# Patient Record
Sex: MALE | Race: WHITE | NOT HISPANIC OR LATINO | Employment: FULL TIME | URBAN - METROPOLITAN AREA
[De-identification: names, ages, dates, MRNs, and addresses within clinical notes are randomized per-mention and may not be internally consistent; named-entity substitution may affect disease eponyms.]

---

## 2019-10-15 ENCOUNTER — HOSPITAL ENCOUNTER (EMERGENCY)
Facility: CLINIC | Age: 29
Discharge: HOME | End: 2019-10-15
Attending: PHYSICIAN ASSISTANT | Admitting: PHYSICIAN ASSISTANT
Payer: COMMERCIAL

## 2019-10-15 VITALS
SYSTOLIC BLOOD PRESSURE: 124 MMHG | RESPIRATION RATE: 18 BRPM | TEMPERATURE: 98.8 F | OXYGEN SATURATION: 100 % | HEIGHT: 75 IN | WEIGHT: 245 LBS | HEART RATE: 72 BPM | DIASTOLIC BLOOD PRESSURE: 82 MMHG | BODY MASS INDEX: 30.46 KG/M2

## 2019-10-15 DIAGNOSIS — J20.9 ACUTE BRONCHITIS WITH SYMPTOMS > 10 DAYS: ICD-10-CM

## 2019-10-15 PROCEDURE — G0463 HOSPITAL OUTPT CLINIC VISIT: HCPCS | Performed by: PHYSICIAN ASSISTANT

## 2019-10-15 PROCEDURE — 99214 OFFICE O/P EST MOD 30 MIN: CPT | Mod: Z6 | Performed by: PHYSICIAN ASSISTANT

## 2019-10-15 RX ORDER — ALBUTEROL SULFATE 90 UG/1
2 AEROSOL, METERED RESPIRATORY (INHALATION) EVERY 6 HOURS PRN
Qty: 1 INHALER | Refills: 0 | Status: SHIPPED | OUTPATIENT
Start: 2019-10-15 | End: 2023-03-02

## 2019-10-15 RX ORDER — BENZONATATE 100 MG/1
100-200 CAPSULE ORAL 3 TIMES DAILY PRN
Qty: 42 CAPSULE | Refills: 0 | Status: SHIPPED | OUTPATIENT
Start: 2019-10-15 | End: 2019-11-14

## 2019-10-15 RX ORDER — PREDNISONE 20 MG/1
40 TABLET ORAL DAILY
Qty: 10 TABLET | Refills: 0 | Status: SHIPPED | OUTPATIENT
Start: 2019-10-15 | End: 2019-10-20

## 2019-10-15 ASSESSMENT — MIFFLIN-ST. JEOR: SCORE: 2161.94

## 2019-10-15 NOTE — ED AVS SNAPSHOT
[facility] Emergency Department  [address] [address]  Phone:  [phone]  Fax:  [phone]                                    Isaac Martinez   MRN: [id number]    Department:  [facility] Emergency Department   Date of Visit:  10/15/2019           After Visit Summary Signature Page    I have received my discharge instructions, and my questions have been answered. I have discussed any challenges I see with this plan with the nurse or doctor.    ..........................................................................................................................................  Patient/Patient Representative Signature      ..........................................................................................................................................  Patient Representative Print Name and Relationship to Patient    ..................................................               ................................................  Date                                   Time    ..........................................................................................................................................  Reviewed by Signature/Title    ...................................................              ..............................................  Date                                               Time          22EPIC Rev 08/18

## 2019-10-15 NOTE — ED PROVIDER NOTES
History     Chief Complaint   Patient presents with     Cough     cough  x 2 weeks  unable to sleep  nasal congestion       HPI  Isaac Martinez is a 29 year old male who presents to the urgent care with concern over 2-week history of cough accompanied by nasal congestion, shortness of breath and wheezing.  He states that he may or may not have had an intermittent fever.  He denies any objective fever measured on thermometer, vomiting, diarrhea or abdominal pain.  He denies any history of asthma, COPD or other breathing related disorders.  He is a non-smoker.  He reports that he has been using his wife's inhaler and a left over prescription for robitussin with codeine for the last several days without relief.      Allergies:  No Known Allergies    Problem List:    Patient Active Problem List    Diagnosis Date Noted     CARDIOVASCULAR SCREENING; LDL GOAL LESS THAN 160 10/31/2010     Priority: Medium     Attention deficit hyperactivity disorder (ADHD) 09/01/2005     Priority: Medium     Problem list name updated by automated process. Provider to review          Past Medical History:    Past Medical History:   Diagnosis Date     Attention deficit disorder with hyperactivity(314.01)        Past Surgical History:    Past Surgical History:   Procedure Laterality Date     SURGICAL HISTORY OF -       PE tubes as infant       Family History:    Family History   Problem Relation Age of Onset     Respiratory Mother         ASTHMA       Social History:  Marital Status:  Single [1]  Social History     Tobacco Use     Smoking status: Never Smoker   Substance Use Topics     Alcohol use: No     Drug use: No        Medications:    albuterol (PROAIR HFA/PROVENTIL HFA/VENTOLIN HFA) 108 (90 Base) MCG/ACT inhaler  benzonatate (TESSALON) 100 MG capsule  predniSONE (DELTASONE) 20 MG tablet      Review of Systems  CONSTITUTIONAL:POSITIVE  for questionable intermittent subjective fever, chills, myalgias  INTEGUMENTARY/SKIN: NEGATIVE for  "worrisome rashes, moles or lesions  EYES: NEGATIVE for vision changes or irritation  ENT/MOUTH: POSITIVE for nasal congestion, sore throat NEGATIVE for ear pain  RESP:POSITIVE for cough, shortness of breath, wheezing  GI: NEGATIVE for nausea, vomiting, diarrhea, abdominal pain   Physical Exam   BP: 124/82  Pulse: 72  Temp: 98.8  F (37.1  C)  Resp: 18  Height: 190.5 cm (6' 3\")  Weight: 111.1 kg (245 lb)  SpO2: 100 %  Physical Exam  GENERAL APPEARANCE: healthy, alert and no distress  EYES: EOMI,  PERRL, conjunctiva clear  HENT: ear canals and TM's normal.  Nose and mouth without ulcers, erythema or lesions  NECK: supple, nontender, no lymphadenopathy  RESP: iminished breath sounds initially however with improved respiratory effort he had expiratory wheezing bilaterally  CV: regular rates and rhythm, normal S1 S2, no murmur noted  SKIN: no suspicious lesions or rashes  ED Course        Procedures          Critical Care time:  none            No results found for this or any previous visit (from the past 24 hour(s)).    Medications - No data to display    Assessments & Plan (with Medical Decision Making)     I have reviewed the nursing notes.    I have reviewed the findings, diagnosis, plan and need for follow up with the patient.       New Prescriptions    ALBUTEROL (PROAIR HFA/PROVENTIL HFA/VENTOLIN HFA) 108 (90 BASE) MCG/ACT INHALER    Inhale 2 puffs into the lungs every 6 hours as needed for shortness of breath / dyspnea or wheezing    BENZONATATE (TESSALON) 100 MG CAPSULE    Take 1-2 capsules (100-200 mg) by mouth 3 times daily as needed for cough    PREDNISONE (DELTASONE) 20 MG TABLET    Take 2 tablets (40 mg) by mouth daily for 5 days       Final diagnoses:   Acute bronchitis with symptoms > 10 days     29-year-old male presents to the urgent care with concern over 2-week history of cough, URI nasal congestion, shortness of breath, wheezing and questionable intermittent fever, chills, myalgias.  He had stable " vital signs upon arrival.  Physical exam findings as described above were initially seen decreased breath sounds however patient appeared to have poor respiratory effort, upon greater effort he was noted to have bilateral expiratory wheezing.  Symptoms are consistent with viral bronchitis.  Patient was discharged home stable with prescription for albuterol inhaler, Tessalon, prednisone.  I do not suspect pneumonia, pertussis, PE, bacterial sinusitis at this time will defer further evaluation.  He was instructed to follow-up if no improvement within the next 48 to 72 hours.  Worrisome reasons to return to the ER/UC sooner discussed.    Disclaimer: This note consists of symbols derived from keyboarding, dictation, and/or voice recognition software. As a result, there may be errors in the script that have gone undetected.  Please consider this when interpreting information found in the chart.      10/15/2019   [facility] EMERGENCY DEPARTMENT     Christiana Smith PA-C  10/15/19 2023

## 2023-03-02 ENCOUNTER — OFFICE VISIT (OUTPATIENT)
Dept: INTERNAL MEDICINE | Facility: CLINIC | Age: 33
End: 2023-03-02
Payer: COMMERCIAL

## 2023-03-02 ENCOUNTER — TELEPHONE (OUTPATIENT)
Dept: INTERNAL MEDICINE | Facility: CLINIC | Age: 33
End: 2023-03-02

## 2023-03-02 VITALS
HEART RATE: 95 BPM | BODY MASS INDEX: 34.36 KG/M2 | DIASTOLIC BLOOD PRESSURE: 86 MMHG | WEIGHT: 274.9 LBS | OXYGEN SATURATION: 97 % | SYSTOLIC BLOOD PRESSURE: 122 MMHG

## 2023-03-02 DIAGNOSIS — R06.83 SNORING: ICD-10-CM

## 2023-03-02 DIAGNOSIS — G25.81 RESTLESS LEGS SYNDROME: ICD-10-CM

## 2023-03-02 DIAGNOSIS — J45.901 MILD ASTHMA WITH ACUTE EXACERBATION, UNSPECIFIED WHETHER PERSISTENT: Primary | ICD-10-CM

## 2023-03-02 PROCEDURE — 99203 OFFICE O/P NEW LOW 30 MIN: CPT | Performed by: NURSE PRACTITIONER

## 2023-03-02 RX ORDER — PREDNISONE 20 MG/1
40 TABLET ORAL DAILY
Qty: 5 TABLET | Refills: 0 | Status: SHIPPED | OUTPATIENT
Start: 2023-03-02 | End: 2023-03-03

## 2023-03-02 RX ORDER — ALBUTEROL SULFATE 90 UG/1
2 AEROSOL, METERED RESPIRATORY (INHALATION) EVERY 6 HOURS PRN
Qty: 18 G | Refills: 3 | Status: SHIPPED | OUTPATIENT
Start: 2023-03-02 | End: 2023-04-17

## 2023-03-02 ASSESSMENT — PAIN SCALES - GENERAL: PAINLEVEL: NO PAIN (0)

## 2023-03-02 NOTE — PROGRESS NOTES
Assessment & Plan   Problem List Items Addressed This Visit    None  Visit Diagnoses     Mild asthma with acute exacerbation, unspecified whether persistent    -  Primary    Relevant Medications    albuterol (PROAIR HFA/PROVENTIL HFA/VENTOLIN HFA) 108 (90 Base) MCG/ACT inhaler    predniSONE (DELTASONE) 20 MG tablet    Snoring        Relevant Orders    Adult Sleep Eval & Management  Referral    Restless legs syndrome        Relevant Medications    predniSONE (DELTASONE) 20 MG tablet    Other Relevant Orders    Adult Sleep Eval & Management  Referral           No follow-ups on file.    Anny Perales NP  [facility] ROME Yap is a 32 year old, presenting for the following health issues:  Asthma, restless leg, and Refill Request      HPI           Review of Systems   Unremarkable other than listed above and below         Objective    /86 (BP Location: Right arm, Patient Position: Sitting, Cuff Size: Adult Regular)   Pulse 95   Wt 124.7 kg (274 lb 14.4 oz)   SpO2 97%   BMI 34.36 kg/m    Body mass index is 34.36 kg/m .  Physical Exam   GENERAL: healthy, alert and no distress  EYES: Eyes grossly normal to inspection, PERRL and conjunctivae and sclerae normal  NECK: no adenopathy  RESP: Diminished at the bases grossly clear throughout wheeze noted no clear cough  CV: regular rate and rhythm, normal S1 S2,  PSYCH: mentation appears normal, affect normal/bright

## 2023-03-02 NOTE — TELEPHONE ENCOUNTER
"[facility] pharmacy calling wanting to verify dosage and instructions for use for the Prednisone Rx they received earlier today.  Directions state \"take 2 tablets by mouth daily\" but only a quantity of 5 tablets is to be dispensed.  Is this correct?  Please call pharmacy to discuss at [phone].   "

## 2023-03-03 RX ORDER — PREDNISONE 20 MG/1
40 TABLET ORAL DAILY
Qty: 10 TABLET | Refills: 0 | Status: SHIPPED | OUTPATIENT
Start: 2023-03-03 | End: 2023-04-17

## 2023-04-17 ENCOUNTER — OFFICE VISIT (OUTPATIENT)
Dept: FAMILY MEDICINE | Facility: CLINIC | Age: 33
End: 2023-04-17
Payer: COMMERCIAL

## 2023-04-17 VITALS
DIASTOLIC BLOOD PRESSURE: 80 MMHG | OXYGEN SATURATION: 96 % | TEMPERATURE: 98.3 F | WEIGHT: 278 LBS | HEART RATE: 100 BPM | SYSTOLIC BLOOD PRESSURE: 122 MMHG | BODY MASS INDEX: 34.75 KG/M2

## 2023-04-17 DIAGNOSIS — R06.83 SNORING: ICD-10-CM

## 2023-04-17 DIAGNOSIS — J45.901 MILD ASTHMA WITH ACUTE EXACERBATION, UNSPECIFIED WHETHER PERSISTENT: ICD-10-CM

## 2023-04-17 DIAGNOSIS — F90.9 ATTENTION DEFICIT HYPERACTIVITY DISORDER (ADHD), UNSPECIFIED ADHD TYPE: ICD-10-CM

## 2023-04-17 DIAGNOSIS — K21.00 GASTROESOPHAGEAL REFLUX DISEASE WITH ESOPHAGITIS WITHOUT HEMORRHAGE: Primary | ICD-10-CM

## 2023-04-17 PROCEDURE — 99214 OFFICE O/P EST MOD 30 MIN: CPT | Performed by: FAMILY MEDICINE

## 2023-04-17 RX ORDER — ALBUTEROL SULFATE 90 UG/1
2 AEROSOL, METERED RESPIRATORY (INHALATION) EVERY 6 HOURS PRN
Qty: 18 G | Refills: 11 | Status: SHIPPED | OUTPATIENT
Start: 2023-04-17 | End: 2023-05-22

## 2023-04-17 RX ORDER — FAMOTIDINE 40 MG/1
40 TABLET, FILM COATED ORAL DAILY
Qty: 90 TABLET | Refills: 0 | Status: SHIPPED | OUTPATIENT
Start: 2023-04-17 | End: 2023-05-22

## 2023-04-17 RX ORDER — DEXTROAMPHETAMINE SACCHARATE, AMPHETAMINE ASPARTATE MONOHYDRATE, DEXTROAMPHETAMINE SULFATE AND AMPHETAMINE SULFATE 2.5; 2.5; 2.5; 2.5 MG/1; MG/1; MG/1; MG/1
10 CAPSULE, EXTENDED RELEASE ORAL DAILY
Qty: 30 CAPSULE | Refills: 0 | Status: SHIPPED | OUTPATIENT
Start: 2023-04-17 | End: 2023-09-28

## 2023-04-17 NOTE — PATIENT INSTRUCTIONS
Stay away from greasy foods (do small amounts), dark chocolate, caffeine beverages. You're going to want to limit the size of the meals, and what time your last meal is. Stay upright for 1-2 hours after your last meal.   If this does not improve it after 1-2 months, we will try a stronger medication, and if that doesn't work, we will do a referral to a GI doctor.   For the ADHD medication, we will do 10 mg.

## 2023-04-17 NOTE — PROGRESS NOTES
Assessment & Plan     Gastroesophageal reflux disease with esophagitis without hemorrhage  Chronic, new diagnosis. Patient's chest pain likely from GERD as opposed to asthma. Supporting evidence including: night time symptom awakening, dietary relation, burning sensation, and difficulty swallowing. Will try famotidine to get symptoms to a minor, episodic event and wean off at that point.   - famotidine (PEPCID) 40 MG tablet  Dispense: 90 tablet; Refill: 0  - PRIMARY CARE FOLLOW-UP SCHEDULING    Snoring  Chronic, patient reports wife reports significant snoring. Due to increased weight, poor energy and focus, recommend sleep evaluation.   - Adult Sleep Eval & Management  Referral    Attention deficit hyperactivity disorder (ADHD), unspecified ADHD type  Chronic, has been diagnosed in the past. Was on stimulants but noted some blunting. Would recommend low dose and extended release. Will reassess in one month.   - amphetamine-dextroamphetamine (ADDERALL XR) 10 MG 24 hr capsule  Dispense: 30 capsule; Refill: 0    Mild asthma with acute exacerbation, unspecified whether persistent  Chronic, stable. Refill ordered.   - albuterol (PROAIR HFA/PROVENTIL HFA/VENTOLIN HFA) 108 (90 Base) MCG/ACT inhaler  Dispense: 18 g; Refill: 11      Ordering of each unique test  Prescription drug management  I spent a total of 30 minutes on the day of the visit.   Time spent by me doing chart review, history and exam, documentation and further activities per the note       See Patient Instructions    DO DARBY Youssef [facility] TOPHERELIECER Yap is a 33 year old, presenting for the following health issues:  Asthma (Seems to flare up after meals), restless leg syndrome, and Establish Care        4/17/2023     2:52 PM   Additional Questions   Roomed by ROBE Pack   Accompanied by n/a     History of Present Illness       Reason for visit:  Asthma    He eats 2-3 servings of fruits and vegetables  daily.He consumes 3 sweetened beverage(s) daily.He exercises with enough effort to increase his heart rate 30 to 60 minutes per day.  He exercises with enough effort to increase his heart rate 5 days per week.   He is taking medications regularly.       Asthma Follow-Up    Was ACT completed today?  No      Do you have a cough?  YES, due to allegeries    Are you experiencing any wheezing in your chest?  No    Do you have any shortness of breath?  YES, after eating meals    How often are you using a short-acting (rescue) inhaler or nebulizer, such as Albuterol?  2-3 times per day    How many days per week do you miss taking your asthma controller medication?  I do not have an asthma controller medication    Please describe any recent triggers for your asthma: smoke and dust mites    Have you had any Emergency Room Visits, Urgent Care Visits, or Hospital Admissions since your last office visit?  No    Patient gets tight chest and SOB, and coughing sensation after eating with some solid food coming up. Does sometimes get worse with laying down at night. It does get worse with carbonated soda. Denies difficulty swallowing. He also reports feeling easily full. He denies pain or vomiting after meals. It does not occur when he exerts himself, he does not use it specifically for his inhaler.     Patient has not been on adderall for awhile for ADHD. Patient reports that he has a lot of tossing and turning with sleeping and feeling restless. Patient has been on adderall XR, found 25-35 mg was too high. He was diagnosed as a child.     He also reports he has difficulty sleeping as he cannot comfortable. Worsens with poor sleep. Patient also reports poor sleep quality.     Review of Systems   Constitutional, HEENT, cardiovascular, pulmonary, gi and gu systems are negative, except as otherwise noted.      Objective    /80 (BP Location: Right arm, Patient Position: Sitting, Cuff Size: Adult Regular)   Pulse 100   Temp 98.3   F (36.8  C) (Oral)   Wt 126.1 kg (278 lb)   SpO2 96%   BMI 34.75 kg/m    Body mass index is 34.75 kg/m .  Physical Exam   GENERAL: healthy, alert and no distress  EYES: Eyes grossly normal to inspection, PERRL and conjunctivae and sclerae normal  NECK: no adenopathy, no asymmetry, masses, or scars and thyroid normal to palpation  RESP: lungs clear to auscultation - no rales, rhonchi or wheezes  CV: regular rate and rhythm, normal S1 S2, no S3 or S4, no murmur, click or rub, no peripheral edema and peripheral pulses strong  ABDOMEN: soft, nontender, no hepatosplenomegaly, no masses and bowel sounds normal  MS: no gross musculoskeletal defects noted, no edema  SKIN: no suspicious lesions or rashes

## 2023-05-14 DIAGNOSIS — K21.00 GASTROESOPHAGEAL REFLUX DISEASE WITH ESOPHAGITIS WITHOUT HEMORRHAGE: ICD-10-CM

## 2023-05-14 DIAGNOSIS — F90.9 ATTENTION DEFICIT HYPERACTIVITY DISORDER (ADHD), UNSPECIFIED ADHD TYPE: ICD-10-CM

## 2023-05-14 RX ORDER — DEXTROAMPHETAMINE SACCHARATE, AMPHETAMINE ASPARTATE MONOHYDRATE, DEXTROAMPHETAMINE SULFATE AND AMPHETAMINE SULFATE 2.5; 2.5; 2.5; 2.5 MG/1; MG/1; MG/1; MG/1
10 CAPSULE, EXTENDED RELEASE ORAL DAILY
Qty: 30 CAPSULE | Refills: 0 | Status: CANCELLED | OUTPATIENT
Start: 2023-05-14

## 2023-05-14 RX ORDER — FAMOTIDINE 40 MG/1
40 TABLET, FILM COATED ORAL DAILY
Qty: 90 TABLET | Refills: 0 | Status: CANCELLED | OUTPATIENT
Start: 2023-05-14

## 2023-05-14 NOTE — TELEPHONE ENCOUNTER
Incoming FAX - Prescription Refill Requests    Amphetamine-Dextroamphetamine ER Oral Capsule 10 mg 24 hr capsule    AND    Famotidine 40 mg capsule

## 2023-05-22 ENCOUNTER — OFFICE VISIT (OUTPATIENT)
Dept: FAMILY MEDICINE | Facility: CLINIC | Age: 33
End: 2023-05-22
Attending: FAMILY MEDICINE
Payer: COMMERCIAL

## 2023-05-22 VITALS
HEART RATE: 105 BPM | HEIGHT: 75 IN | DIASTOLIC BLOOD PRESSURE: 78 MMHG | WEIGHT: 265.9 LBS | SYSTOLIC BLOOD PRESSURE: 118 MMHG | BODY MASS INDEX: 33.06 KG/M2 | OXYGEN SATURATION: 97 % | TEMPERATURE: 97.6 F

## 2023-05-22 DIAGNOSIS — K21.00 GASTROESOPHAGEAL REFLUX DISEASE WITH ESOPHAGITIS WITHOUT HEMORRHAGE: ICD-10-CM

## 2023-05-22 DIAGNOSIS — J45.901 MILD ASTHMA WITH ACUTE EXACERBATION, UNSPECIFIED WHETHER PERSISTENT: ICD-10-CM

## 2023-05-22 DIAGNOSIS — F90.2 ATTENTION DEFICIT HYPERACTIVITY DISORDER (ADHD), COMBINED TYPE: Primary | ICD-10-CM

## 2023-05-22 PROCEDURE — 99214 OFFICE O/P EST MOD 30 MIN: CPT | Performed by: FAMILY MEDICINE

## 2023-05-22 RX ORDER — ALBUTEROL SULFATE 90 UG/1
2 AEROSOL, METERED RESPIRATORY (INHALATION) EVERY 6 HOURS PRN
Qty: 18 G | Refills: 11 | Status: SHIPPED | OUTPATIENT
Start: 2023-05-22 | End: 2023-10-23

## 2023-05-22 RX ORDER — DEXTROAMPHETAMINE SACCHARATE, AMPHETAMINE ASPARTATE MONOHYDRATE, DEXTROAMPHETAMINE SULFATE AND AMPHETAMINE SULFATE 2.5; 2.5; 2.5; 2.5 MG/1; MG/1; MG/1; MG/1
10 CAPSULE, EXTENDED RELEASE ORAL DAILY
Qty: 30 CAPSULE | Refills: 0 | Status: SHIPPED | OUTPATIENT
Start: 2023-05-22 | End: 2023-06-21

## 2023-05-22 RX ORDER — OMEPRAZOLE 40 MG/1
40 CAPSULE, DELAYED RELEASE ORAL DAILY
Qty: 30 CAPSULE | Refills: 0 | Status: SHIPPED | OUTPATIENT
Start: 2023-05-22 | End: 2023-06-23

## 2023-05-22 RX ORDER — DEXTROAMPHETAMINE SACCHARATE, AMPHETAMINE ASPARTATE MONOHYDRATE, DEXTROAMPHETAMINE SULFATE AND AMPHETAMINE SULFATE 2.5; 2.5; 2.5; 2.5 MG/1; MG/1; MG/1; MG/1
10 CAPSULE, EXTENDED RELEASE ORAL DAILY
Qty: 30 CAPSULE | Refills: 0 | Status: SHIPPED | OUTPATIENT
Start: 2023-06-22 | End: 2023-07-22

## 2023-05-22 RX ORDER — DEXTROAMPHETAMINE SACCHARATE, AMPHETAMINE ASPARTATE MONOHYDRATE, DEXTROAMPHETAMINE SULFATE AND AMPHETAMINE SULFATE 2.5; 2.5; 2.5; 2.5 MG/1; MG/1; MG/1; MG/1
10 CAPSULE, EXTENDED RELEASE ORAL DAILY
Qty: 30 CAPSULE | Refills: 0 | Status: SHIPPED | OUTPATIENT
Start: 2023-07-23 | End: 2023-08-23

## 2023-05-22 ASSESSMENT — ASTHMA QUESTIONNAIRES
ACT_TOTALSCORE: 22
QUESTION_4 LAST FOUR WEEKS HOW OFTEN HAVE YOU USED YOUR RESCUE INHALER OR NEBULIZER MEDICATION (SUCH AS ALBUTEROL): ONCE A WEEK OR LESS
QUESTION_3 LAST FOUR WEEKS HOW OFTEN DID YOUR ASTHMA SYMPTOMS (WHEEZING, COUGHING, SHORTNESS OF BREATH, CHEST TIGHTNESS OR PAIN) WAKE YOU UP AT NIGHT OR EARLIER THAN USUAL IN THE MORNING: NOT AT ALL
QUESTION_5 LAST FOUR WEEKS HOW WOULD YOU RATE YOUR ASTHMA CONTROL: WELL CONTROLLED
QUESTION_2 LAST FOUR WEEKS HOW OFTEN HAVE YOU HAD SHORTNESS OF BREATH: ONCE OR TWICE A WEEK
QUESTION_1 LAST FOUR WEEKS HOW MUCH OF THE TIME DID YOUR ASTHMA KEEP YOU FROM GETTING AS MUCH DONE AT WORK, SCHOOL OR AT HOME: NONE OF THE TIME
ACT_TOTALSCORE: 22

## 2023-05-22 ASSESSMENT — PAIN SCALES - GENERAL: PAINLEVEL: NO PAIN (0)

## 2023-05-22 NOTE — PROGRESS NOTES
"  Assessment & Plan     Attention deficit hyperactivity disorder (ADHD), combined type  Chronic, stable. Denies appetite loss or emotional blunting. Likely could go up in dose to 15 mg, but patient declines for now.   - amphetamine-dextroamphetamine (ADDERALL XR) 10 MG 24 hr capsule  Dispense: 30 capsule; Refill: 0  - amphetamine-dextroamphetamine (ADDERALL XR) 10 MG 24 hr capsule  Dispense: 30 capsule; Refill: 0  - amphetamine-dextroamphetamine (ADDERALL XR) 10 MG 24 hr capsule  Dispense: 30 capsule; Refill: 0  - PRIMARY CARE FOLLOW-UP SCHEDULING    Gastroesophageal reflux disease with esophagitis without hemorrhage  Chronic, improved. Most symptoms dissipated except for feeling of fullness with swallowing. Will try short course of PPI and then follow up. If patient's symptoms stable, can decrease, as patient has made significant lifestyle changes including weight loss.   - PRIMARY CARE FOLLOW-UP SCHEDULING  - omeprazole (PRILOSEC) 40 MG DR capsule  Dispense: 30 capsule; Refill: 0    Mild asthma with acute exacerbation, unspecified whether persistent  Chronic, stable. Rescue inhaler RX only.   - albuterol (PROAIR HFA/PROVENTIL HFA/VENTOLIN HFA) 108 (90 Base) MCG/ACT inhaler  Dispense: 18 g; Refill: 11      Prescription drug management       BMI:   Estimated body mass index is 33.24 kg/m  as calculated from the following:    Height as of this encounter: 1.905 m (6' 3\").    Weight as of this encounter: 120.6 kg (265 lb 14.4 oz).   Weight management plan: Discussed healthy diet and exercise guidelines    See Patient Instructions    Lizbeth Kessler DO  [facility]    Luis Yap is a 33 year old, presenting for the following health issues:  Follow Up (Asthma )        5/22/2023     9:47 AM   Additional Questions   Roomed by Baylee JARRETT MA   Accompanied by GILMER     History of Present Illness     Asthma:  He presents for follow up of asthma.  He has no cough, no wheezing, and no shortness of " "breath. He is using a relief medication a few times a week. He does not miss any doses of his controller medication throughout the week.Patient is aware of the following triggers: none and upper respiratory infections. The patient has not had a visit to the Emergency Room, Urgent Care or Hospital due to asthma since the last clinic visit.     He eats 4 or more servings of fruits and vegetables daily.He consumes 0 sweetened beverage(s) daily.He exercises with enough effort to increase his heart rate 20 to 29 minutes per day.  He exercises with enough effort to increase his heart rate 4 days per week.   He is taking medications regularly.     Patient symptoms with acid reflux not really improved. Patient feels that stuck feeling in his throat. Patient's symptoms has not increased. Patient reports he's significantly improved in terms nausea feeling or heart burn.     ADHD is well controlled.     Review of Systems   Constitutional, HEENT, cardiovascular, pulmonary, gi and gu systems are negative, except as otherwise noted.      Objective    /78   Pulse 105   Temp 97.6  F (36.4  C) (Oral)   Ht 1.905 m (6' 3\")   Wt 120.6 kg (265 lb 14.4 oz)   SpO2 97%   BMI 33.24 kg/m    Body mass index is 33.24 kg/m .  Physical Exam   GENERAL: healthy, alert and no distress  EYES: Eyes grossly normal to inspection, PERRL and conjunctivae and sclerae normal  MS: no gross musculoskeletal defects noted, no edema  SKIN: no suspicious lesions or rashes  PSYCH: mentation appears normal, affect normal/bright                "

## 2023-05-22 NOTE — PATIENT INSTRUCTIONS
1.You can become constipated with this medicine for the acid reflux. If you do not like it, I would recommend doing a dose every other day to prevent rebound acid reflux.  2. We will continue the adderal- let's do this over zoom. You can do this up to twice a year.

## 2023-06-23 DIAGNOSIS — K21.00 GASTROESOPHAGEAL REFLUX DISEASE WITH ESOPHAGITIS WITHOUT HEMORRHAGE: ICD-10-CM

## 2023-06-24 RX ORDER — OMEPRAZOLE 40 MG/1
40 CAPSULE, DELAYED RELEASE ORAL DAILY
Qty: 90 CAPSULE | Refills: 3 | Status: SHIPPED | OUTPATIENT
Start: 2023-06-24

## 2023-06-24 NOTE — TELEPHONE ENCOUNTER
"Last Written Prescription Date: 5/22/2023  Last Fill Quantity: 30,  # refills: 0  Last office visit provider:  5/22/2023 with PCP Dr BANDAR Kessler    Requested Prescriptions   Pending Prescriptions Disp Refills     omeprazole (PRILOSEC) 40 MG DR capsule 30 capsule 3     Sig: Take 1 capsule (40 mg) by mouth daily       PPI Protocol Passed - 6/23/2023  6:42 PM        Passed - Not on Clopidogrel (unless Pantoprazole ordered)        Passed - No diagnosis of osteoporosis on record        Passed - Recent (12 mo) or future (30 days) visit within the authorizing provider's specialty     Patient has had an office visit with the authorizing provider or a provider within the authorizing providers department within the previous 12 mos or has a future within next 30 days. See \"Patient Info\" tab in inbasket, or \"Choose Columns\" in Meds & Orders section of the refill encounter.              Passed - Medication is active on med list        Passed - Patient is age 18 or older             Anabel Marcial RN 06/24/23 4:18 PM  "

## 2023-08-21 ENCOUNTER — HOSPITAL ENCOUNTER (EMERGENCY)
Facility: HOSPITAL | Age: 33
Discharge: HOME | End: 2023-08-21
Admitting: PHYSICIAN ASSISTANT

## 2023-08-21 VITALS
RESPIRATION RATE: 16 BRPM | HEIGHT: 75 IN | OXYGEN SATURATION: 94 % | WEIGHT: 250 LBS | TEMPERATURE: 97.7 F | SYSTOLIC BLOOD PRESSURE: 132 MMHG | DIASTOLIC BLOOD PRESSURE: 77 MMHG | BODY MASS INDEX: 31.08 KG/M2 | HEART RATE: 110 BPM

## 2023-08-21 DIAGNOSIS — H66.001 NON-RECURRENT ACUTE SUPPURATIVE OTITIS MEDIA OF RIGHT EAR WITHOUT SPONTANEOUS RUPTURE OF TYMPANIC MEMBRANE: ICD-10-CM

## 2023-08-21 LAB — GROUP A STREP BY PCR: NOT DETECTED

## 2023-08-21 PROCEDURE — 87651 STREP A DNA AMP PROBE: CPT | Performed by: PHYSICIAN ASSISTANT

## 2023-08-21 PROCEDURE — 250N000013 HC RX MED GY IP 250 OP 250 PS 637: Performed by: PHYSICIAN ASSISTANT

## 2023-08-21 PROCEDURE — 99283 EMERGENCY DEPT VISIT LOW MDM: CPT

## 2023-08-21 RX ADMIN — AMOXICILLIN AND CLAVULANATE POTASSIUM 1 TABLET: 875; 125 TABLET, FILM COATED ORAL at 21:24

## 2023-08-21 ASSESSMENT — ACTIVITIES OF DAILY LIVING (ADL): ADLS_ACUITY_SCORE: 33

## 2023-08-21 ASSESSMENT — ENCOUNTER SYMPTOMS: SORE THROAT: 1

## 2023-08-22 ASSESSMENT — ENCOUNTER SYMPTOMS
CHILLS: 0
MYALGIAS: 0
COUGH: 0
SHORTNESS OF BREATH: 0
FEVER: 0

## 2023-08-22 NOTE — ED TRIAGE NOTES
Pt says he has had a sore throat and ear pain (both on the right) since about 1500 yesterday. Noted hr from 97 to 122, take adderall and just took a hit off of his inhaler for asthma     Triage Assessment       Row Name 08/21/23 2032       Triage Assessment (Adult)    Airway WDL WDL       Respiratory WDL    Respiratory WDL WDL       Skin Circulation/Temperature WDL    Skin Circulation/Temperature WDL WDL       Cardiac WDL    Cardiac WDL WDL       Peripheral/Neurovascular WDL    Peripheral Neurovascular WDL WDL       Cognitive/Neuro/Behavioral WDL    Cognitive/Neuro/Behavioral WDL WDL

## 2023-08-22 NOTE — DISCHARGE INSTRUCTIONS
As we discussed, you do have an ear infection and we will place you on the antibiotic Augmentin which she should take and finish as prescribed.  We will call you if your strep test returns positive.  Please drink plenty of fluids, and use Tylenol and Motrin as needed for discomfort.  If it anytime you develop worsening pain, fever, difficulty hearing, difficulty swallowing or breathing, please return to the ER for further evaluation.    Your blood pressure was elevated in the emergencydepartment today and requires recheck and close follow-up in your primary care clinic. Untreated blood pressure can cause serious complications including, but not limited to stroke, heart attack/failure, and kidney disease.  Please make a close follow-up appointment to have this recheck performed. Please return to the emergency department immediately if you develop a severe headache, vision changes, chest pain, shortness of breath, orabdominal pain.

## 2023-08-22 NOTE — ED PROVIDER NOTES
Emergency Department Encounter   NAME: Isaac Martinez ; AGE: 33 year old male ; YOB: 1990 ; MRN: [id number] ; PCP: Lizbeth Kessler   ED PROVIDER: Sonja Bowman PA-C    Evaluation Date & Time:   8/21/2023  8:45 PM    CHIEF COMPLAINT:  sore throat and ear ache      Impression and Plan   MDM:   Isaac Martinez is a 33 year old male with no pertinent history who presents to the ED by walk-in for evaluation of right ear and throat pain.  The patient presents to the emergency department for evaluation of several days of right-sided ear pain, fullness, and sore throat.  Here in the emergency department, he is moderately hypertensive, mildly tachycardic though vitally stable.  He states that he used his albuterol inhaler prior to arrival in the emergency department which she suspects is the source of his elevated heart rate.  This is a very well-appearing patient who is in no acute distress.  He does have some mild posterior oropharynx erythema, however there is no exudates, tonsillar enlargement, or evidence of tonsillitis.  Uvula is midline without deviation and there is no asymmetric soft palate swelling, nothing to suggest PTA. strep obtained and negative.  His right ear exam is consistent with otitis media given erythematous TM which is bulging with purulent middle ear effusion.  There is no evidence of TM perforation, otitis externa, or mastoiditis.  I suspect that this is the source of his pain.  First dose of Augmentin given in the ER and prescription provided for home.  Reviewed supportive measures and concerning signs and symptoms to return to the ER.  Offered viral testing today, though he declined and he will test for COVID-19 if needed at home.  Discharged in stable condition.    Medical Decision Making    History:  Supplemental history from: Documented in chart, if applicable  External Record(s) reviewed: Documented in chart, if applicable.    Work Up:  Chart documentation includes  differential considered and any EKGs or imaging independently interpreted by provider, where specified.  In additional to work up documented, I considered the following work up: Documented in chart, if applicable.    External consultation:  Discussion of management with another provider: Documented in chart, if applicable    Complicating factors:  Care impacted by chronic illness: N/A  Care affected by social determinants of health: N/A    Disposition considerations: Discharge. No recommendations on prescription strength medication(s). See documentation for any additional details.      ED COURSE:  9:09 PM I met and introduced myself to the patient. I gathered initial history and performed my physical exam. We discussed plan for initial workup.    9:18 PM I rechecked the patient and discussed results, discharge, follow up, and reasons to return to the ED.     At the conclusion of the encounter I discussed the results of all the tests and the disposition. The questions were answered. The patient or family acknowledged understanding and was agreeable with the care plan.    FINAL IMPRESSION:    ICD-10-CM    1. Non-recurrent acute suppurative otitis media of right ear without spontaneous rupture of tympanic membrane  H66.001             MEDICATIONS GIVEN IN THE EMERGENCY DEPARTMENT:  Medications   amoxicillin-clavulanate (AUGMENTIN) 875-125 MG per tablet 1 tablet (1 tablet Oral $Given 8/21/23 2124)         NEW PRESCRIPTIONS STARTED AT TODAY'S ED VISIT:  Discharge Medication List as of 8/21/2023  9:25 PM        START taking these medications    Details   amoxicillin-clavulanate (AUGMENTIN) 875-125 MG tablet Take 1 tablet by mouth 2 times daily for 7 days, Disp-14 tablet, R-0, Local Print               HPI   Patient information was obtained from: Patient   Use of Intrepreter: N/A     Isaac Martinez is a 33 year old male with no pertinent history who presents to the ED by walk-in for evaluation of right ear and throat  "pain.    Patient reports an intermittent sore throat on the right side starting yesterday and an intermittent ear ache with fluid that bothered him throughout the night. He says he tried to use a q-tip to clear out ear fluid without relief. He reports that the pain in the throat and ear worsened this afternoon. Otherwise, patient denies any fever, cough, nausea, and vomiting. No other complaints at this time.      REVIEW OF SYSTEMS:  Review of Systems   Constitutional:  Negative for chills and fever.   HENT:  Positive for ear discharge (right), ear pain (right) and sore throat (right side).    Respiratory:  Negative for cough and shortness of breath.    Musculoskeletal:  Negative for myalgias.   All other systems reviewed and are negative.        Medical History     Past Medical History:   Diagnosis Date    Attention deficit disorder with hyperactivity(314.01)        Past Surgical History:   Procedure Laterality Date    SURGICAL HISTORY OF -       PE tubes as infant       Family History   Problem Relation Age of Onset    Respiratory Mother         ASTHMA       Social History     Tobacco Use    Smoking status: Never     Passive exposure: Never    Smokeless tobacco: Never   Substance Use Topics    Alcohol use: No    Drug use: No       amoxicillin-clavulanate (AUGMENTIN) 875-125 MG tablet  albuterol (PROAIR HFA/PROVENTIL HFA/VENTOLIN HFA) 108 (90 Base) MCG/ACT inhaler  amphetamine-dextroamphetamine (ADDERALL XR) 10 MG 24 hr capsule  amphetamine-dextroamphetamine (ADDERALL XR) 10 MG 24 hr capsule  omeprazole (PRILOSEC) 40 MG DR capsule          Physical Exam     First Vitals:  Patient Vitals for the past 24 hrs:   BP Temp Temp src Pulse Resp SpO2 Height Weight   08/21/23 2116 132/77 -- -- -- -- 94 % -- --   08/21/23 2030 (!) 155/83 97.7  F (36.5  C) Temporal 110 16 96 % 1.905 m (6' 3\") 113.4 kg (250 lb)         PHYSICAL EXAM:   Physical Exam  Vitals and nursing note reviewed.   Constitutional:       General: He is not " in acute distress.     Appearance: Normal appearance. He is not ill-appearing or toxic-appearing.   HENT:      Head: Normocephalic and atraumatic.      Right Ear: External ear normal.      Left Ear: Tympanic membrane, ear canal and external ear normal.      Ears:      Comments: Right TM is erythematous, bulging, and purulent ear effusion. TM intact without perforation. No mastoid tenderness, edema, or erythema.      Nose: No congestion.      Mouth/Throat:      Mouth: Mucous membranes are moist.      Pharynx: Oropharynx is clear.      Comments: Mild posterior oropharynx erythema.  Uvula is midline without deviation and there is no asymmetric soft palate swelling.  No exudates or tonsillar enlargement.  Neck:      Comments: No tender adenopathy.  No stridor.  Cardiovascular:      Rate and Rhythm: Normal rate and regular rhythm.      Heart sounds: Normal heart sounds.   Pulmonary:      Effort: Pulmonary effort is normal.      Breath sounds: Normal breath sounds.   Musculoskeletal:      Cervical back: Normal range of motion and neck supple.   Neurological:      Mental Status: He is alert.             Results     LAB:  All pertinent labs reviewed and interpreted  Labs Ordered and Resulted from Time of ED Arrival to Time of ED Departure - No data to display    RADIOLOGY:  No orders to display           ITherese, am serving as a scribe to document services personally performed by Sonja Bowman PA-C, based on my observation and the provider's statements to me. ISonja PA-C attest that Therese Burnette is acting in a scribe capacity, has observed my performance of the services and has documented them in accordance with my direction.       Sonja Bowman PA-C   Emergency Medicine   [facility] EMERGENCY DEPARTMENT       Sonja Bowman PA-C  08/22/23 0009

## 2023-08-23 DIAGNOSIS — F90.2 ATTENTION DEFICIT HYPERACTIVITY DISORDER (ADHD), COMBINED TYPE: ICD-10-CM

## 2023-08-23 RX ORDER — DEXTROAMPHETAMINE SACCHARATE, AMPHETAMINE ASPARTATE MONOHYDRATE, DEXTROAMPHETAMINE SULFATE AND AMPHETAMINE SULFATE 2.5; 2.5; 2.5; 2.5 MG/1; MG/1; MG/1; MG/1
10 CAPSULE, EXTENDED RELEASE ORAL DAILY
Qty: 30 CAPSULE | Refills: 0 | Status: SHIPPED | OUTPATIENT
Start: 2023-08-23

## 2023-09-27 DIAGNOSIS — F90.9 ATTENTION DEFICIT HYPERACTIVITY DISORDER (ADHD), UNSPECIFIED ADHD TYPE: ICD-10-CM

## 2023-09-27 NOTE — TELEPHONE ENCOUNTER
Medication Question or Refill    Contacts         Type Contact Phone/Fax    09/27/2023 05:37 PM CDT Phone (Incoming) Fracisco Martinez (Self) [phone] (M)            What medication are you calling about (include dose and sig)?:     amphetamine-dextroamphetamine (ADDERALL XR) 10 MG 24 hr capsule     Preferred Pharmacy:       [id number] - [place], [place] - [facility]  [address] [place] [address]   [facility] [address]  Phone: [phone] Fax: [phone]      Controlled Substance Agreement on file:   CSA -- Patient Level:    CSA: None found at the patient level.       Who prescribed the medication?: Dr. Lizbeth Subramanian    Do you need a refill? Yes    When did you use the medication last? Unknown - Medication was taken from vehicle, only had a small amount remaining    Patient offered an appointment? No    Do you have any questions or concerns?  No      Okay to leave a detailed message?: Yes at Cell number on file:    Telephone Information:   Mobile [phone]   Mobile Not on file.

## 2023-09-28 NOTE — TELEPHONE ENCOUNTER
Pt needing refill on Adderall   Last refill 8/23/23  Last seen in office 5/2023    Med pended, unless you are needing to see pt again before refill

## 2023-09-29 RX ORDER — DEXTROAMPHETAMINE SACCHARATE, AMPHETAMINE ASPARTATE MONOHYDRATE, DEXTROAMPHETAMINE SULFATE AND AMPHETAMINE SULFATE 2.5; 2.5; 2.5; 2.5 MG/1; MG/1; MG/1; MG/1
10 CAPSULE, EXTENDED RELEASE ORAL DAILY
Qty: 30 CAPSULE | Refills: 0 | Status: SHIPPED | OUTPATIENT
Start: 2023-09-29

## 2023-10-23 ENCOUNTER — VIRTUAL VISIT (OUTPATIENT)
Dept: FAMILY MEDICINE | Facility: CLINIC | Age: 33
End: 2023-10-23

## 2023-10-23 DIAGNOSIS — F90.2 ATTENTION DEFICIT HYPERACTIVITY DISORDER (ADHD), COMBINED TYPE: ICD-10-CM

## 2023-10-23 DIAGNOSIS — J45.901 MILD ASTHMA WITH ACUTE EXACERBATION, UNSPECIFIED WHETHER PERSISTENT: ICD-10-CM

## 2023-10-23 PROCEDURE — 99214 OFFICE O/P EST MOD 30 MIN: CPT | Mod: VID | Performed by: FAMILY MEDICINE

## 2023-10-23 RX ORDER — BUDESONIDE 0.5 MG/2ML
0.5 INHALANT ORAL DAILY
Qty: 60 ML | Refills: 0 | Status: SHIPPED | OUTPATIENT
Start: 2023-10-23

## 2023-10-23 RX ORDER — DEXTROAMPHETAMINE SACCHARATE, AMPHETAMINE ASPARTATE MONOHYDRATE, DEXTROAMPHETAMINE SULFATE AND AMPHETAMINE SULFATE 2.5; 2.5; 2.5; 2.5 MG/1; MG/1; MG/1; MG/1
10 CAPSULE, EXTENDED RELEASE ORAL DAILY
Qty: 30 CAPSULE | Refills: 0 | Status: SHIPPED | OUTPATIENT
Start: 2023-12-24 | End: 2024-01-23

## 2023-10-23 RX ORDER — PREDNISONE 20 MG/1
TABLET ORAL
Qty: 20 TABLET | Refills: 0 | Status: SHIPPED | OUTPATIENT
Start: 2023-10-23

## 2023-10-23 RX ORDER — ALBUTEROL SULFATE 90 UG/1
2 AEROSOL, METERED RESPIRATORY (INHALATION) EVERY 6 HOURS PRN
Qty: 18 G | Refills: 11 | Status: SHIPPED | OUTPATIENT
Start: 2023-10-23

## 2023-10-23 RX ORDER — DEXTROAMPHETAMINE SACCHARATE, AMPHETAMINE ASPARTATE MONOHYDRATE, DEXTROAMPHETAMINE SULFATE AND AMPHETAMINE SULFATE 2.5; 2.5; 2.5; 2.5 MG/1; MG/1; MG/1; MG/1
10 CAPSULE, EXTENDED RELEASE ORAL DAILY
Qty: 30 CAPSULE | Refills: 0 | Status: CANCELLED | OUTPATIENT
Start: 2023-10-23

## 2023-10-23 RX ORDER — DEXTROAMPHETAMINE SACCHARATE, AMPHETAMINE ASPARTATE MONOHYDRATE, DEXTROAMPHETAMINE SULFATE AND AMPHETAMINE SULFATE 2.5; 2.5; 2.5; 2.5 MG/1; MG/1; MG/1; MG/1
10 CAPSULE, EXTENDED RELEASE ORAL DAILY
Qty: 30 CAPSULE | Refills: 0 | Status: SHIPPED | OUTPATIENT
Start: 2023-11-23 | End: 2023-12-23

## 2023-10-23 RX ORDER — DEXTROAMPHETAMINE SACCHARATE, AMPHETAMINE ASPARTATE MONOHYDRATE, DEXTROAMPHETAMINE SULFATE AND AMPHETAMINE SULFATE 2.5; 2.5; 2.5; 2.5 MG/1; MG/1; MG/1; MG/1
10 CAPSULE, EXTENDED RELEASE ORAL DAILY
Qty: 30 CAPSULE | Refills: 0 | Status: SHIPPED | OUTPATIENT
Start: 2023-10-23 | End: 2023-11-22

## 2023-10-23 ASSESSMENT — ANXIETY QUESTIONNAIRES
GAD7 TOTAL SCORE: 4
6. BECOMING EASILY ANNOYED OR IRRITABLE: MORE THAN HALF THE DAYS
GAD7 TOTAL SCORE: 4
IF YOU CHECKED OFF ANY PROBLEMS ON THIS QUESTIONNAIRE, HOW DIFFICULT HAVE THESE PROBLEMS MADE IT FOR YOU TO DO YOUR WORK, TAKE CARE OF THINGS AT HOME, OR GET ALONG WITH OTHER PEOPLE: NOT DIFFICULT AT ALL
4. TROUBLE RELAXING: NOT AT ALL
7. FEELING AFRAID AS IF SOMETHING AWFUL MIGHT HAPPEN: NOT AT ALL
1. FEELING NERVOUS, ANXIOUS, OR ON EDGE: MORE THAN HALF THE DAYS
3. WORRYING TOO MUCH ABOUT DIFFERENT THINGS: NOT AT ALL
2. NOT BEING ABLE TO STOP OR CONTROL WORRYING: NOT AT ALL
5. BEING SO RESTLESS THAT IT IS HARD TO SIT STILL: NOT AT ALL

## 2023-10-23 ASSESSMENT — PATIENT HEALTH QUESTIONNAIRE - PHQ9
SUM OF ALL RESPONSES TO PHQ QUESTIONS 1-9: 0
SUM OF ALL RESPONSES TO PHQ QUESTIONS 1-9: 0
10. IF YOU CHECKED OFF ANY PROBLEMS, HOW DIFFICULT HAVE THESE PROBLEMS MADE IT FOR YOU TO DO YOUR WORK, TAKE CARE OF THINGS AT HOME, OR GET ALONG WITH OTHER PEOPLE: NOT DIFFICULT AT ALL

## 2023-10-23 NOTE — PROGRESS NOTES
"Fracisco is a 33 year old who is being evaluated via a billable video visit.      Assessment & Plan     Attention deficit hyperactivity disorder (ADHD), combined type  Chronic, stable.  - amphetamine-dextroamphetamine (ADDERALL XR) 10 MG 24 hr capsule  Dispense: 30 capsule; Refill: 0  - amphetamine-dextroamphetamine (ADDERALL XR) 10 MG 24 hr capsule  Dispense: 30 capsule; Refill: 0  - amphetamine-dextroamphetamine (ADDERALL XR) 10 MG 24 hr capsule  Dispense: 30 capsule; Refill: 0    Mild asthma with acute exacerbation, unspecified whether persistent  Chronic, stable. Refills ordered.   - albuterol (PROAIR HFA/PROVENTIL HFA/VENTOLIN HFA) 108 (90 Base) MCG/ACT inhaler  Dispense: 18 g; Refill: 11  - predniSONE (DELTASONE) 20 MG tablet  Dispense: 20 tablet; Refill: 0  - budesonide (PULMICORT) 0.5 MG/2ML neb solution  Dispense: 60 mL; Refill: 0      Prescription drug management    See Patient Instructions    [facility]    Luis Yap is a 33 year old, presenting for the following health issues:  Recheck Medication        10/23/2023     9:48 AM   Additional Questions   Roomed by Wei KOLB   Accompanied by N/A       History of Present Illness       Reason for visit:  Med check    He eats 2-3 servings of fruits and vegetables daily.He consumes 1 sweetened beverage(s) daily.He exercises with enough effort to increase his heart rate 9 or less minutes per day.  He exercises with enough effort to increase his heart rate 3 or less days per week.   He is taking medications regularly.     ADHD  -Switched careers, patient is now self employed.   -No issues with the medications.     Review of Systems   Constitutional, HEENT, cardiovascular, pulmonary, gi and gu systems are negative, except as otherwise noted.      Objective    Vitals - Patient Reported  Weight (Patient Reported): 117.9 kg (260 lb)  Height (Patient Reported): 190.5 cm (6' 3\")  BMI (Based on Pt Reported Ht/Wt): " 32.5  Pain Score: Severe Pain (7)  Pain Loc: Mid Back      Vitals:  No vitals were obtained today due to virtual visit.    Physical Exam   GENERAL: Healthy, alert and no distress  EYES: Eyes grossly normal to inspection.  No discharge or erythema, or obvious scleral/conjunctival abnormalities.  RESP: No audible wheeze, cough, or visible cyanosis.  No visible retractions or increased work of breathing.    SKIN: Visible skin clear. No significant rash, abnormal pigmentation or lesions.  NEURO: Cranial nerves grossly intact.  Mentation and speech appropriate for age.  PSYCH: Mentation appears normal, affect normal/bright, judgement and insight intact, normal speech and appearance well-groomed.          Video-Visit Details    Type of service:  Video Visit     Originating Location (pt. Location): Home  Distant Location (provider location):  On-site  Platform used for Video Visit: Velma

## 2024-11-05 DIAGNOSIS — J45.901 MILD ASTHMA WITH ACUTE EXACERBATION, UNSPECIFIED WHETHER PERSISTENT: ICD-10-CM

## 2024-11-05 RX ORDER — ALBUTEROL SULFATE 90 UG/1
2 INHALANT RESPIRATORY (INHALATION) EVERY 6 HOURS PRN
Qty: 18 G | Refills: 11 | Status: SHIPPED | OUTPATIENT
Start: 2024-11-05

## 2024-11-05 NOTE — TELEPHONE ENCOUNTER
FAX Prescription Refill Request    Last Visit with PCP:  Virtual Video 10/23/23  Next Visit with PCP: nothing scheduled    ALBUTEROL SULFATE HFA INHALATION AEROSOL SOLUTION

## 2024-12-05 ENCOUNTER — HOSPITAL ENCOUNTER (EMERGENCY)
Facility: CLINIC | Age: 34
Discharge: HOME | End: 2024-12-05

## 2024-12-05 VITALS
DIASTOLIC BLOOD PRESSURE: 101 MMHG | OXYGEN SATURATION: 95 % | SYSTOLIC BLOOD PRESSURE: 148 MMHG | TEMPERATURE: 98.9 F | HEART RATE: 120 BPM | RESPIRATION RATE: 28 BRPM

## 2024-12-05 DIAGNOSIS — J20.9 ACUTE BRONCHITIS: ICD-10-CM

## 2024-12-05 PROCEDURE — 99213 OFFICE O/P EST LOW 20 MIN: CPT

## 2024-12-05 PROCEDURE — G0463 HOSPITAL OUTPT CLINIC VISIT: HCPCS

## 2024-12-05 RX ORDER — PREDNISONE 20 MG/1
TABLET ORAL
Qty: 10 TABLET | Refills: 0 | Status: SHIPPED | OUTPATIENT
Start: 2024-12-05

## 2024-12-05 RX ORDER — BENZONATATE 100 MG/1
100 CAPSULE ORAL 3 TIMES DAILY PRN
Qty: 15 CAPSULE | Refills: 0 | Status: SHIPPED | OUTPATIENT
Start: 2024-12-05 | End: 2024-12-10

## 2024-12-05 RX ORDER — ALBUTEROL SULFATE 90 UG/1
2 INHALANT RESPIRATORY (INHALATION) EVERY 6 HOURS PRN
Qty: 18 G | Refills: 0 | Status: SHIPPED | OUTPATIENT
Start: 2024-12-05

## 2024-12-05 RX ORDER — AZITHROMYCIN 250 MG/1
TABLET, FILM COATED ORAL
Qty: 6 TABLET | Refills: 0 | Status: SHIPPED | OUTPATIENT
Start: 2024-12-05 | End: 2024-12-10

## 2024-12-05 ASSESSMENT — COLUMBIA-SUICIDE SEVERITY RATING SCALE - C-SSRS
1. IN THE PAST MONTH, HAVE YOU WISHED YOU WERE DEAD OR WISHED YOU COULD GO TO SLEEP AND NOT WAKE UP?: NO
6. HAVE YOU EVER DONE ANYTHING, STARTED TO DO ANYTHING, OR PREPARED TO DO ANYTHING TO END YOUR LIFE?: NO
2. HAVE YOU ACTUALLY HAD ANY THOUGHTS OF KILLING YOURSELF IN THE PAST MONTH?: NO

## 2024-12-05 NOTE — ED PROVIDER NOTES
History     Chief Complaint   Patient presents with    Cough     Patient has asthma   Patient states he has been experiencing sinus pressure last week and cough   Patient has been experiencing tightness in chest , patient has been using his inhaler with little relief      HPI  Isaac Martinez is a 34 year old male past medical history significant for asthma who presents urgent care with chief complaint of cough and nasal congestion for the last 7 days.  Patient reports he has uses albuterol inhaler 12 times today and is still experiencing some chest tightness and wheezing.  Patient reports he has asthma exacerbation approximately once per year.  Patient reports this feels very similar.  Patient reports he is very stressed out as his wife is currently in labor he is also changing jobs and has been more stressed out.  Patient denies fever, chills, fatigue, headache, nausea, vomiting, vision changes, chest pain, dyspnea, abdominal pain.    Allergies:  No Known Allergies    Problem List:    Patient Active Problem List    Diagnosis Date Noted    CARDIOVASCULAR SCREENING; LDL GOAL LESS THAN 160 10/31/2010     Priority: Medium    Attention deficit hyperactivity disorder (ADHD) 09/01/2005     Priority: Medium     Problem list name updated by automated process. Provider to review          Past Medical History:    Past Medical History:   Diagnosis Date    Attention deficit disorder with hyperactivity(314.01)        Past Surgical History:    Past Surgical History:   Procedure Laterality Date    SURGICAL HISTORY OF -       PE tubes as infant       Family History:    Family History   Problem Relation Age of Onset    Respiratory Mother         ASTHMA       Social History:  Marital Status:   [2]  Social History     Tobacco Use    Smoking status: Never     Passive exposure: Never    Smokeless tobacco: Never   Substance Use Topics    Alcohol use: No    Drug use: No        Medications:    albuterol (PROAIR HFA/PROVENTIL  HFA/VENTOLIN HFA) 108 (90 Base) MCG/ACT inhaler  azithromycin (ZITHROMAX) 250 MG tablet  benzonatate (TESSALON) 100 MG capsule  predniSONE (DELTASONE) 20 MG tablet  albuterol (PROAIR HFA/PROVENTIL HFA/VENTOLIN HFA) 108 (90 Base) MCG/ACT inhaler  amphetamine-dextroamphetamine (ADDERALL XR) 10 MG 24 hr capsule  amphetamine-dextroamphetamine (ADDERALL XR) 10 MG 24 hr capsule  budesonide (PULMICORT) 0.5 MG/2ML neb solution  omeprazole (PRILOSEC) 40 MG DR capsule  predniSONE (DELTASONE) 20 MG tablet          Review of Systems   All other systems reviewed and are negative.      Physical Exam   BP: (!) 148/101  Pulse: 120  Temp: 98.9  F (37.2  C)  Resp: 28  SpO2: 95 %      Physical Exam  Vitals and nursing note reviewed.   Constitutional:       General: He is not in acute distress.     Appearance: Normal appearance. He is not ill-appearing or toxic-appearing.   HENT:      Head: Normocephalic.      Right Ear: Tympanic membrane, ear canal and external ear normal.      Left Ear: Tympanic membrane, ear canal and external ear normal.      Nose: No congestion.      Mouth/Throat:      Pharynx: Posterior oropharyngeal erythema present.   Cardiovascular:      Rate and Rhythm: Normal rate and regular rhythm.      Pulses: Normal pulses.      Heart sounds: Normal heart sounds.   Pulmonary:      Effort: Pulmonary effort is normal. No respiratory distress.      Breath sounds: Wheezing (Bilateral) present. No rhonchi or rales.   Musculoskeletal:      Cervical back: Neck supple.   Skin:     Findings: No rash.   Neurological:      Mental Status: He is alert.   Psychiatric:         Mood and Affect: Mood normal.         Behavior: Behavior normal.         ED Course        Procedures           No results found for this or any previous visit (from the past 24 hours).    Medications - No data to display    Assessments & Plan (with Medical Decision Making)     I have reviewed the nursing notes.    I have reviewed the findings, diagnosis, plan and  need for follow up with the patient.      Medical Decision Making    Patient is a 34 year old male past medical history significant for asthma who presents urgent care with chief complaint of cough and nasal congestion for the last 7 days.  Patient reports he has uses albuterol inhaler 12 times today and is still experiencing some chest tightness and wheezing.  Patient reports he has asthma exacerbation approximately once per year.  Patient reports this feels very similar.  Patient reports he is very stressed out as his wife is currently in labor he is also changing jobs and has been more stressed out.  Patient denies fever, chills, fatigue, headache, nausea, vomiting, vision changes, chest pain, dyspnea, abdominal pain.        On exam patient is afebrile, mildly hypertensive and tachycardic.  On auscultation there is bilateral wheezing.  No decrease of breath sounds or crackles.  Heart regular rhythm.     I suspect hypertension and tachycardia or a side effect of albuterol which patient has taken 12 times today along with the stress of his wife going into labor and work.  Patient does not want further cardiovascular workup today.  No current symptoms of headache, vision changes, chest pain.    Given history of asthma and wheezing I suspect asthma exacerbation secondary to viral illness.  Plan to treat patient with azithromycin and prednisone today.  Patient also given refill of albuterol and Tessalon.  I encouraged patient not to use albuterol as often.  If symptoms are worsening over the next 48 hours patient may need addition of Augmentin for suspicion of pneumonia.  Patient should follow-up in ER if chest pain, headache, vision changes or syncope develop.      Prior to making a final disposition on this patient the results of patient's tests and other diagnostic studies were discussed with the patient. All questions were answered. Patient expressed understanding of the plan and was amenable to it.      Disclaimer: This note consists of symbols derived from keyboarding, dictation and/or voice recognition software. As a result, there may be errors in the script that have gone undetected. Please consider this when interpreting information found in this chart.      New Prescriptions    ALBUTEROL (PROAIR HFA/PROVENTIL HFA/VENTOLIN HFA) 108 (90 BASE) MCG/ACT INHALER    Inhale 2 puffs into the lungs every 6 hours as needed for shortness of breath, wheezing or cough.    AZITHROMYCIN (ZITHROMAX) 250 MG TABLET    Take 2 tablets (500 mg) by mouth daily for 1 day, THEN 1 tablet (250 mg) daily for 4 days.    BENZONATATE (TESSALON) 100 MG CAPSULE    Take 1 capsule (100 mg) by mouth 3 times daily as needed for cough.    PREDNISONE (DELTASONE) 20 MG TABLET    Take two tablets (= 40mg) each day for 5 (five) days       Final diagnoses:   Acute bronchitis       12/5/2024   [facility] EMERGENCY DEPT       Krista Marcos PA-C  12/05/24 8246

## 2024-12-05 NOTE — DISCHARGE INSTRUCTIONS
Symptoms should start improving over the next few days.  Start prednisone and azithromycin today.  You may take Tessalon as needed up to every 8 hours for cough.  Do not buy the Tessalon as will make her mouth numb.  If symptoms are not improving over the next 2 days please contact urgent care and ask for Diamond and I will add on a second antibiotic.

## 2024-12-05 NOTE — Clinical Note
Isaac Martinez was seen and treated in our emergency department on 12/5/2024.  He may return to work on 12/09/2024.       If you have any questions or concerns, please don't hesitate to call.      Krista Marcos PA-C

## 2025-07-02 ENCOUNTER — HOSPITAL ENCOUNTER (EMERGENCY)
Facility: CLINIC | Age: 35
Discharge: HOME | End: 2025-07-02
Attending: NURSE PRACTITIONER | Admitting: NURSE PRACTITIONER

## 2025-07-02 VITALS — TEMPERATURE: 102.5 F | OXYGEN SATURATION: 98 % | HEART RATE: 101 BPM | RESPIRATION RATE: 17 BRPM

## 2025-07-02 DIAGNOSIS — J02.0 STREP PHARYNGITIS: ICD-10-CM

## 2025-07-02 LAB
FLUAV RNA SPEC QL NAA+PROBE: NEGATIVE
FLUBV RNA RESP QL NAA+PROBE: NEGATIVE
RSV RNA SPEC NAA+PROBE: NEGATIVE
S PYO DNA THROAT QL NAA+PROBE: DETECTED
SARS-COV-2 RNA RESP QL NAA+PROBE: NEGATIVE

## 2025-07-02 PROCEDURE — 87637 SARSCOV2&INF A&B&RSV AMP PRB: CPT | Performed by: NURSE PRACTITIONER

## 2025-07-02 PROCEDURE — G0463 HOSPITAL OUTPT CLINIC VISIT: HCPCS

## 2025-07-02 PROCEDURE — 87651 STREP A DNA AMP PROBE: CPT | Performed by: NURSE PRACTITIONER

## 2025-07-02 RX ORDER — AMOXICILLIN 500 MG/1
500 CAPSULE ORAL 2 TIMES DAILY
Qty: 20 CAPSULE | Refills: 0 | Status: SHIPPED | OUTPATIENT
Start: 2025-07-02 | End: 2025-07-12

## 2025-07-03 NOTE — DISCHARGE INSTRUCTIONS
Testing was positive, ordered amoxicillin for you twice daily for 10 days.  COVID RSV and influenza testing is negative. Tylenol and ibuprofen for fevers and pain.  Follow-up here if symptoms are not improving despite recommended treatment plan   1.94

## 2025-07-03 NOTE — ED PROVIDER NOTES
Chief Complaint:   No chief complaint on file.        HPI:   Isaac Martinez is a 35 year old male who presents to the UC/ED with a 1 day history of sore throat and fever of 102.0.  Patient is febrile on arrival today.  Appears ill but nontoxic.    He denies diarrhea, ear pressure, nasal congestion, nausea, shortness of breath, vomiting, and wheezing.  He has been exposed to ill contacts.   Medical history/ Predisposing factors include:of: History of well-controlled mild asthma.  Reports not using any long-acting steroids inhalers and he has not had to use his albuterol inhaler recently.      Problem List:    Patient Active Problem List    Diagnosis Date Noted    CARDIOVASCULAR SCREENING; LDL GOAL LESS THAN 160 10/31/2010     Priority: Medium    Attention deficit hyperactivity disorder (ADHD) 09/01/2005     Priority: Medium     Problem list name updated by automated process. Provider to review          Past Medical History:    Past Medical History:   Diagnosis Date    Attention deficit disorder with hyperactivity(314.01)        Past Surgical History:    Past Surgical History:   Procedure Laterality Date    SURGICAL HISTORY OF -       PE tubes as infant         Meds:   Current Outpatient Medications   Medication Sig Dispense Refill    albuterol (PROAIR HFA/PROVENTIL HFA/VENTOLIN HFA) 108 (90 Base) MCG/ACT inhaler Inhale 2 puffs into the lungs every 6 hours as needed for shortness of breath, wheezing or cough. 18 g 0    albuterol (PROAIR HFA/PROVENTIL HFA/VENTOLIN HFA) 108 (90 Base) MCG/ACT inhaler Inhale 2 puffs into the lungs every 6 hours as needed for shortness of breath or wheezing. 18 g 11    amphetamine-dextroamphetamine (ADDERALL XR) 10 MG 24 hr capsule Take 1 capsule (10 mg) by mouth daily 30 capsule 0    amphetamine-dextroamphetamine (ADDERALL XR) 10 MG 24 hr capsule Take 1 capsule (10 mg) by mouth daily 30 capsule 0    budesonide (PULMICORT) 0.5 MG/2ML neb solution Take 2 mLs (0.5 mg) by nebulization daily  During the flares, use this in addition 60 mL 0    omeprazole (PRILOSEC) 40 MG DR capsule Take 1 capsule (40 mg) by mouth daily 90 capsule 3    predniSONE (DELTASONE) 20 MG tablet Take two tablets (= 40mg) each day for 5 (five) days 10 tablet 0    predniSONE (DELTASONE) 20 MG tablet Take 3 tabs by mouth daily x 3 days, then 2 tabs daily x 3 days, then 1 tab daily x 3 days, then 1/2 tab daily x 3 days. 20 tablet 0       Allergies:   No Known Allergies    Medications updated and reviewed.  Past, family and surgical history is updated and reviewed in the record.     Review of Systems:  General: see HPI  HEENT: see HPI  Respiratory: see HPI    Physical Exam:   There were no vitals taken for this visit.     General: No acute distress on arrival  Head: normocephalic, non-traumatic.  Eyes: Non-reddened conjunctiva, no icterus, noninjected, normal pupillary response to light accommodation bilaterally.  Ears: Left ear: TM intact, middle ear non-erythemic, no purulence, canal non-erythemic, patent. Right ear: TM intact, middle ear non-erythemic without purulence, canal non-erythremic, patent.  Nose: Non-erythemic, no purulence present no edema, patent nostrils.  Mouth/Throat: No oral lesions, moist mucous membranes, erythemic, midline uvula non enlarged tonsils or exudates present.  Mild anterior bilateral cervical adenopathy present..  CV: Regular rate and rhythm, no cyanosis.  Respiratory: Nonlabored, CTA bilateral throughout.   Abdomen: NT, ND, normal bowel sounds present  Skin: No rashes, lesions, normal color.  Neuro: Normal, active, age-appropriate. Normal response to verbal stimuli. No obvious focal deficits.      Medical Decision Making:  Upper respiratory infection symptoms with Abnormal -febrile vitals.  CXR is not indicated.  Rapid Strep test is indicated. COVID/RSV/Influenza A and B testing is    Recent Results (from the past 48 hours)   Influenza A/B, RSV and SARS-CoV2 PCR (COVID-19) Nose    Specimen: Nose; Swab    Result Value Ref Range    Influenza A PCR Negative Negative    Influenza B PCR Negative Negative    RSV PCR Negative Negative    SARS CoV2 PCR Negative Negative    Narrative    Testing was performed using the Xpert Xpress CoV2/Flu/RSV Assay on the [employer] Instrument. This test should be ordered for the detection of SARS-CoV2, influenza, and RSV viruses in individuals with signs and symptoms of respiratory tract infection. This test is for in vitro diagnostic use under the [place] FDA for laboratories certified under CLIA to perform high or moderate complexity testing. This test has been [place] FDA cleared. A negative result does not rule out the presence of PCR inhibitors in the specimen or target RNA in concentration below the limit of detection for the assay. If only one viral target is positive but coinfection with multiple targets is suspected, the sample should be re-tested with another FDA cleared, approved, or authorized test, if coninfection would change clinical management. This test was validated by the [facility] [employer]. These laboratories are certified under the Clinical Laboratory Improvement Amendments of 1988 (CLIA-88) as qualified to perfom high complexity laboratory testing.   Group A Streptococcus PCR Throat Swab    Specimen: Throat; Swab   Result Value Ref Range    Group A strep by PCR Detected (A) Not Detected    Narrative    The Xpert Xpress Strep A test, performed on the [employer], is a rapid, qualitative in vitro diagnostic test for the detection of Streptococcus pyogenes (Group A ß-hemolytic Streptococcus, Strep A) in throat swab specimens from patients with signs and symptoms of pharyngitis. The Xpert Xpress Strep A test can be used as an aid in the diagnosis of Group A Streptococcal pharyngitis. The assay is not intended to monitor treatment for Group A Streptococcus infections. The Xpert Xpress Strep A test utilizes an automated real-time polymerase  chain reaction (PCR) to detect Streptococcus pyogenes DNA.       Assessment:  Strep pharyngitis and Viral upper respiratory illness    Differentials: RSV, COVID, influenza, testing was negative for these.    Plan:   Tylenol, Ibuprofen, Fluids, and Rx strep  Amoxicillin 1000 mg qd or 500 mg bid x 10 days      Reassurance given regarding diagnosis and recommendations.  Discussed home treatment with antipyretics Prescriptions .  Recommend follow up in primary care as needed, or sooner if symptoms persist. Return to the ED with fever, trouble swallowing or breathing, or any other concerns.         MEDICATIONS GIVEN IN THE EMERGENCY DEPARTMENT:  Medications - No data to display      I have reviewed the nursing notes.  I have reviewed the findings, diagnosis, plan and need for follow up with the patient.        NEW PRESCRIPTIONS STARTED AT TODAY'S ER VISIT  Discharge Medication List as of 7/2/2025  7:27 PM          Final diagnoses:   Strep pharyngitis       7/2/2025   [facility] EMERGENCY DEPT  Condition on disposition: Stable    Recommended supportive cares to aid with symptoms including cough, fever 1 day. Advised that if symptoms are not improving despite this treatment plan, then they should follow-up with primary provider for reevaluation. Strict UC/ED return precautions discussed in detail including prolonged fevers, development of shortness of breath or trouble with breathing, weakness or lightheadedness, inability to tolerate oral intake or anything else that is concerning to them. All questions were answered. Pt verbalized understanding and agreement with the above plan.     Patient verbalized agreement with and understanding of the rational for the diagnosis and treatment plan.  All questions were answered to best of my ability and the patient's satisfaction. The patient was advised to contact or return to their primary clinic or UC/ED with any questions that may arise after this visit.       Disclaimer: This note consists of symbols derived from keyboarding, dictation, and/or voice recognition software. As a result, there may be errors in the script that have gone undetected.  Please consider this when interpreting information found in the chart.        Erendira Encarnacion APRN CNP  07/02/25 2016

## 2025-07-12 ENCOUNTER — HEALTH MAINTENANCE LETTER (OUTPATIENT)
Age: 35
End: 2025-07-12